# Patient Record
Sex: FEMALE | Race: WHITE | NOT HISPANIC OR LATINO | Employment: UNEMPLOYED | URBAN - METROPOLITAN AREA
[De-identification: names, ages, dates, MRNs, and addresses within clinical notes are randomized per-mention and may not be internally consistent; named-entity substitution may affect disease eponyms.]

---

## 2017-06-28 ENCOUNTER — TRANSCRIBE ORDERS (OUTPATIENT)
Dept: ADMINISTRATIVE | Facility: HOSPITAL | Age: 47
End: 2017-06-28

## 2017-06-28 DIAGNOSIS — Z12.31 VISIT FOR SCREENING MAMMOGRAM: Primary | ICD-10-CM

## 2017-07-05 ENCOUNTER — HOSPITAL ENCOUNTER (OUTPATIENT)
Dept: MAMMOGRAPHY | Facility: CLINIC | Age: 47
Discharge: HOME | End: 2017-07-05
Payer: COMMERCIAL

## 2017-07-05 DIAGNOSIS — Z12.31 VISIT FOR SCREENING MAMMOGRAM: ICD-10-CM

## 2017-07-05 PROCEDURE — G0202 SCR MAMMO BI INCL CAD: HCPCS

## 2017-07-05 PROCEDURE — 77063 BREAST TOMOSYNTHESIS BI: CPT

## 2018-07-02 ENCOUNTER — TRANSCRIBE ORDERS (OUTPATIENT)
Dept: ADMINISTRATIVE | Facility: HOSPITAL | Age: 48
End: 2018-07-02

## 2018-07-02 DIAGNOSIS — Z12.39 SCREENING BREAST EXAMINATION: Primary | ICD-10-CM

## 2018-07-06 ENCOUNTER — HOSPITAL ENCOUNTER (OUTPATIENT)
Dept: MAMMOGRAPHY | Facility: CLINIC | Age: 48
Discharge: HOME | End: 2018-07-06
Payer: COMMERCIAL

## 2018-07-06 DIAGNOSIS — Z12.39 SCREENING BREAST EXAMINATION: ICD-10-CM

## 2018-07-06 PROCEDURE — 77067 SCR MAMMO BI INCL CAD: CPT

## 2018-07-13 ENCOUNTER — HOSPITAL ENCOUNTER (OUTPATIENT)
Dept: MAMMOGRAPHY | Facility: CLINIC | Age: 48
Discharge: HOME | End: 2018-07-13
Payer: COMMERCIAL

## 2018-07-13 DIAGNOSIS — R92.8 ABNORMAL MAMMOGRAM: ICD-10-CM

## 2018-07-13 PROCEDURE — 77065 DX MAMMO INCL CAD UNI: CPT

## 2018-07-13 RX ORDER — BUTALBITAL, ACETAMINOPHEN AND CAFFEINE 50; 325; 40 MG/1; MG/1; MG/1
1 TABLET ORAL EVERY 4 HOURS
COMMUNITY
Start: 2012-05-30

## 2018-07-13 RX ORDER — ZINC GLUCONATE 50 MG
TABLET ORAL
COMMUNITY

## 2018-07-13 RX ORDER — ALPRAZOLAM 0.5 MG/1
0.5 TABLET ORAL
COMMUNITY

## 2018-07-13 RX ORDER — MULTIVIT-MIN/IRON/FOLIC ACID/K 18-600-40
CAPSULE ORAL
COMMUNITY

## 2018-07-13 RX ORDER — AZELASTINE HCL 205.5 UG/1
2 SPRAY NASAL
COMMUNITY
Start: 2017-12-05

## 2018-07-13 NOTE — PROGRESS NOTES
Met with patient and Dr Ermias Arita regarding recommendation for;      _____ RIGHT __x____LEFT      _____Ultrasound guided  ___x___Stereotactic  Breast biopsy  ___x__Verbalized understanding        Blood thinners:  _____yes __x___no    Date stopped: ____n/a_______    Biopsy teaching sheet given:  ____x___yes ______no

## 2018-07-23 ENCOUNTER — HOSPITAL ENCOUNTER (OUTPATIENT)
Dept: MAMMOGRAPHY | Facility: CLINIC | Age: 48
Discharge: HOME | End: 2018-07-23
Payer: COMMERCIAL

## 2018-07-23 ENCOUNTER — HOSPITAL ENCOUNTER (OUTPATIENT)
Dept: MAMMOGRAPHY | Facility: CLINIC | Age: 48
Discharge: HOME | End: 2018-07-23
Admitting: RADIOLOGY
Payer: COMMERCIAL

## 2018-07-23 VITALS — SYSTOLIC BLOOD PRESSURE: 124 MMHG | DIASTOLIC BLOOD PRESSURE: 80 MMHG | HEART RATE: 72 BPM

## 2018-07-23 DIAGNOSIS — R92.8 OTHER ABNORMAL AND INCONCLUSIVE FINDINGS ON DIAGNOSTIC IMAGING OF BREAST: ICD-10-CM

## 2018-07-23 DIAGNOSIS — R92.0 MAMMOGRAPHIC MICROCALCIFICATION: ICD-10-CM

## 2018-07-23 PROCEDURE — 88305 TISSUE EXAM BY PATHOLOGIST: CPT | Performed by: PATHOLOGY

## 2018-07-23 PROCEDURE — 88341 IMHCHEM/IMCYTCHM EA ADD ANTB: CPT | Performed by: PATHOLOGY

## 2018-07-23 PROCEDURE — 88342 IMHCHEM/IMCYTCHM 1ST ANTB: CPT | Performed by: PATHOLOGY

## 2018-07-23 PROCEDURE — 19081 BX BREAST 1ST LESION STRTCTC: CPT

## 2018-07-23 PROCEDURE — 19082 BX BREAST ADD LESION STRTCTC: CPT

## 2018-07-23 RX ORDER — LIDOCAINE HYDROCHLORIDE AND EPINEPHRINE BITARTRATE 20; .01 MG/ML; MG/ML
10 INJECTION, SOLUTION SUBCUTANEOUS ONCE
Status: COMPLETED | OUTPATIENT
Start: 2018-07-23 | End: 2018-07-23

## 2018-07-23 RX ORDER — LIDOCAINE HYDROCHLORIDE 10 MG/ML
5 INJECTION, SOLUTION INFILTRATION; PERINEURAL ONCE
Status: COMPLETED | OUTPATIENT
Start: 2018-07-23 | End: 2018-07-23

## 2018-07-23 RX ADMIN — LIDOCAINE HYDROCHLORIDE 5 ML: 10 INJECTION, SOLUTION INFILTRATION; PERINEURAL at 09:24

## 2018-07-23 RX ADMIN — LIDOCAINE HYDROCHLORIDE 5 ML: 10 INJECTION, SOLUTION INFILTRATION; PERINEURAL at 09:55

## 2018-07-23 RX ADMIN — LIDOCAINE HYDROCHLORIDE AND EPINEPHRINE 10 ML: 20; 10 INJECTION, SOLUTION INFILTRATION; PERINEURAL at 09:55

## 2018-07-23 RX ADMIN — LIDOCAINE HYDROCHLORIDE AND EPINEPHRINE 10 ML: 20; 10 INJECTION, SOLUTION INFILTRATION; PERINEURAL at 09:24

## 2018-07-23 NOTE — PROGRESS NOTES
Procedure type:    _____ultrasound guided ___X__stereotactic    Breast:    __X___Left _____Right    Location:Medial position    Needle:8ga Revolve    # of passes: 3 (1 core with calcs)(2 cores no calcs)    Clip:Triple twist    Performed by:Dr Hannah Dominguez held for 5 minutes by: May Flynn(PCA)    Steri Strips:    ____X_yes _____no    Band aid:    __X___yes_____no    Tape and guaze:    _____yes _____no    Tolerated procedure:    ___X__yes _____no

## 2018-07-23 NOTE — PROGRESS NOTES
POST LARGE CORE BREAST BIOPSY PATIENT INFORMATION      1  Place an ice pack inside your bra over the top of the dressing every hour for 20 minutes (20 minutes on, 60 minutes off)  Do this until bedtime  2  Do not shower or bathe until the following morning  3  You may bathe your breast carefully with the steri-strips in place  Be careful    Not to loosen them  The steri-strips will fall off in 3-5 days  4  You may have mild discomfort, and you may have some bruising where the   Needle entered the skin  This should clear within 5-7 days  5  If you need medicine for discomfort, take acetaminophen products such as   Tylenol  You may also take Advil or Motrin products  6  Do not participate in strenuous activities such as-tennis, aerobics, skiing,  Weight lifting, etc  for 24 hours  Refrain from swimming/soaking for 72 hours  7  Wearing a bra for sleeping may be more comfortable for the first 24-48 hours  8  Watch for continued bleeding, pain or fever over 101; please call with any questions or concerns  For procedures done at the [facility]  [facility] call:  Erik Ding RN at [phone]  Emani Barrett RN at [phone]                    *After 4 PM call the Interventional Radiology Department                    [phone] and ask to speak with the nurse on call  For procedures done at the [address] [place] [address] call:         Shayy Zaman RN at   *After 4 PM call the Interventional Radiology Department   [phone] and ask to speak with the nurse on call  For procedures done at [address] [place] [address] call: The Radiology Nurse at [phone]  *After 4 PM call your physician, or go to the Emergency Department  9          The final results of your biopsy are usually available within one week  Ice pack given:    ___X_yes _____no    Discharge instructions signed by patient:    __X___yes _____no    Discharge instructions given to patient:    ___X__yes _____no    Discharged via:    __X___amulatory    _____wheelchair    _____stretcher    Stable on discharge:    ___X__yes ____no

## 2018-07-23 NOTE — PROGRESS NOTES
Procedure type:    _____ultrasound guided ___X__stereotactic    Breast:    ___X__Left _____Right    Location:Latral position    Needle: 8ga Revolve    # of passes: 4 (3 cores with calcs)(1 core no calcs)    Clip:Toro    Performed by:Dr Jaja Alonzo held for 5 minutes by:  Sidney Flynn(PCA)    Steri Strips:    ___X__yes _____no    Band aid:    __X__yes_____no    Tape and guaze:    _____yes _____no    Tolerated procedure:    __X___yes _____no

## 2018-07-24 NOTE — PROGRESS NOTES
Post procedure call completed on 7/24/18 @ 0846 x    Bleeding: _____yes __x___no    Pain: __x___yes ______no    Redness/Swelling: ___x___yes ______no    Band aid removed: _____yes __x___no    Steri-Strips intact: __x____yes _____no    Pt reports "pretty sore" and a little swollen  Taking Ibuprofen and continues this am with ice as directed  Has not showered yet  Will remove band aid after shower

## 2018-07-25 ENCOUNTER — TRANSCRIBE ORDERS (OUTPATIENT)
Dept: ADMINISTRATIVE | Age: 48
End: 2018-07-25

## 2018-07-25 ENCOUNTER — HOSPITAL ENCOUNTER (OUTPATIENT)
Dept: RADIOLOGY | Age: 48
Discharge: HOME | End: 2018-07-25

## 2018-07-25 DIAGNOSIS — R92.1 BREAST CALCIFICATION SEEN ON MAMMOGRAM: Primary | ICD-10-CM

## 2018-07-25 DIAGNOSIS — R92.1 BREAST CALCIFICATION SEEN ON MAMMOGRAM: ICD-10-CM

## 2018-07-26 ENCOUNTER — TELEPHONE (OUTPATIENT)
Dept: MAMMOGRAPHY | Facility: CLINIC | Age: 48
End: 2018-07-26

## 2018-07-26 NOTE — PROGRESS NOTES
Pt seen today, 7/26/18 at 21  for site evaluation s/p left stereotactic breast biopsy x 2  Pt was concerned about blood spotting from one of the sites  The more inferior biopsy site is clean and dry with steri strips intact  The upper more outer strips were changed and incision is moist but no actual bleeding  There is an area of redness with some warmth around the more upper outer incision, but it appears to be associated with a developing bruise  The area marked with ink pen  Dr Stacie Iniguez also evaluated the site and told her to keep an eye on the site and use the OTC ibuprofen and he also stated ice application can be continued as she said it appears to help with the pain/discomfort

## 2018-07-27 NOTE — PROGRESS NOTES
Pt was seen today, 7/27/18, for a site evaluation  Pt was concerned about bleeding from her left biopsy site which she said was increased from yesterday  Steri strips are bloody, but active bleeding not present  The outer site is more bruised, but no redness evident today  Dr Paulina Velasquez evaluated site and suggested warm compresses to the site which she was told might cause a little more bleeding from the bruise under the skin  Gauze pressure dsg applied and told the pt to remove if any sign of skin irritation from the tape and then place gauze over site with a snug bra if skin becomes irritated  Prerna Moulton

## 2018-08-02 NOTE — PROGRESS NOTES
Pt was seen for a site check today 8/2/18 at 1145  She called to say she had no bleeding from the outer inferior breast biopsy site for 2 days and then noticed bleeding after a shower today  Steri strips removed  No active bleeding or any drainage apparent at site  Small amount of skin redness immediately around biopsy niche which remains slightly open  New steri strips applied, but instructed pt to remove steri strips if no further bleeding occurs  Dr Betty Gutierrez also observed site  Instructed pt to call if she has any further concerns

## 2018-08-10 NOTE — PROGRESS NOTES
Pt called and said she noticed some new bruising in her left breast and so she was seen today 8/10/18 at 1150 for a site check  Left breast biopsy incision sites are healed with a little crusting over the lateral inferior site which had been previously open at last site check  Small area of minimally faint bruising is seen near the second more medial site which she said is new  She still has a lot of tenderness and hematoma is apparent  There is no redness, inflammation or drainage  Dr Nabil Gonzáles recommended warm compresses to the site and stated she could now go swimming because the incisional areas are sealed

## 2019-01-23 ENCOUNTER — HOSPITAL ENCOUNTER (OUTPATIENT)
Dept: MAMMOGRAPHY | Facility: CLINIC | Age: 49
Discharge: HOME | End: 2019-01-23
Payer: COMMERCIAL

## 2019-01-23 VITALS — BODY MASS INDEX: 32.43 KG/M2 | WEIGHT: 183 LBS | HEIGHT: 63 IN

## 2019-01-23 DIAGNOSIS — R92.0 MAMMOGRAPHIC MICROCALCIFICATION: ICD-10-CM

## 2019-01-23 PROCEDURE — 77065 DX MAMMO INCL CAD UNI: CPT

## 2019-01-23 PROCEDURE — G0279 TOMOSYNTHESIS, MAMMO: HCPCS

## 2019-07-12 ENCOUNTER — TRANSCRIBE ORDERS (OUTPATIENT)
Dept: ADMINISTRATIVE | Facility: HOSPITAL | Age: 49
End: 2019-07-12

## 2019-07-12 DIAGNOSIS — Z12.39 BREAST SCREENING, UNSPECIFIED: Primary | ICD-10-CM

## 2019-07-31 ENCOUNTER — HOSPITAL ENCOUNTER (OUTPATIENT)
Dept: MAMMOGRAPHY | Facility: CLINIC | Age: 49
Discharge: HOME | End: 2019-07-31
Payer: COMMERCIAL

## 2019-07-31 VITALS — HEIGHT: 63 IN | WEIGHT: 183 LBS | BODY MASS INDEX: 32.43 KG/M2

## 2019-07-31 DIAGNOSIS — Z12.39 BREAST SCREENING, UNSPECIFIED: ICD-10-CM

## 2019-07-31 DIAGNOSIS — Z12.39 SCREENING FOR MALIGNANT NEOPLASM OF BREAST: ICD-10-CM

## 2019-07-31 PROCEDURE — 77067 SCR MAMMO BI INCL CAD: CPT

## 2019-07-31 PROCEDURE — 77063 BREAST TOMOSYNTHESIS BI: CPT

## 2020-07-24 ENCOUNTER — TRANSCRIBE ORDERS (OUTPATIENT)
Dept: ADMINISTRATIVE | Facility: HOSPITAL | Age: 50
End: 2020-07-24

## 2020-07-24 DIAGNOSIS — Z12.31 ENCOUNTER FOR SCREENING MAMMOGRAM FOR MALIGNANT NEOPLASM OF BREAST: Primary | ICD-10-CM

## 2020-09-08 ENCOUNTER — HOSPITAL ENCOUNTER (OUTPATIENT)
Dept: BONE DENSITY | Facility: IMAGING CENTER | Age: 50
Discharge: HOME | End: 2020-09-08
Payer: COMMERCIAL

## 2020-09-08 VITALS — BODY MASS INDEX: 32.78 KG/M2 | HEIGHT: 63 IN | WEIGHT: 185 LBS

## 2020-09-08 DIAGNOSIS — Z12.31 ENCOUNTER FOR SCREENING MAMMOGRAM FOR MALIGNANT NEOPLASM OF BREAST: ICD-10-CM

## 2020-09-08 PROCEDURE — 77067 SCR MAMMO BI INCL CAD: CPT

## 2020-09-08 PROCEDURE — 77063 BREAST TOMOSYNTHESIS BI: CPT

## 2021-09-15 ENCOUNTER — HOSPITAL ENCOUNTER (OUTPATIENT)
Dept: MAMMOGRAPHY | Facility: IMAGING CENTER | Age: 51
Discharge: HOME | End: 2021-09-15
Payer: COMMERCIAL

## 2021-09-15 VITALS — WEIGHT: 185 LBS | HEIGHT: 63 IN | BODY MASS INDEX: 32.78 KG/M2

## 2021-09-15 DIAGNOSIS — Z12.31 VISIT FOR SCREENING MAMMOGRAM: ICD-10-CM

## 2021-09-15 DIAGNOSIS — Z12.31 ENCOUNTER FOR SCREENING MAMMOGRAM FOR MALIGNANT NEOPLASM OF BREAST: ICD-10-CM

## 2021-09-15 PROCEDURE — 77067 SCR MAMMO BI INCL CAD: CPT

## 2021-09-15 PROCEDURE — 77063 BREAST TOMOSYNTHESIS BI: CPT

## 2022-01-07 ENCOUNTER — APPOINTMENT (OUTPATIENT)
Dept: RADIOLOGY | Facility: CLINIC | Age: 52
End: 2022-01-07
Payer: COMMERCIAL

## 2022-01-07 DIAGNOSIS — M25.559 PAIN IN HIP JOINT: ICD-10-CM

## 2022-01-07 PROCEDURE — 73502 X-RAY EXAM HIP UNI 2-3 VIEWS: CPT

## 2022-12-04 ENCOUNTER — HOSPITAL ENCOUNTER (OUTPATIENT)
Dept: CT IMAGING | Facility: HOSPITAL | Age: 52
Discharge: HOME | End: 2022-12-04
Attending: FAMILY MEDICINE

## 2022-12-04 DIAGNOSIS — R09.89 ABNORMAL CHEST SOUNDS: ICD-10-CM

## 2022-12-21 ENCOUNTER — HOSPITAL ENCOUNTER (OUTPATIENT)
Dept: MAMMOGRAPHY | Facility: CLINIC | Age: 52
Discharge: HOME | End: 2022-12-21

## 2022-12-21 VITALS — WEIGHT: 195 LBS | BODY MASS INDEX: 34.55 KG/M2 | HEIGHT: 63 IN

## 2022-12-21 DIAGNOSIS — Z12.31 ENCOUNTER FOR SCREENING MAMMOGRAM FOR MALIGNANT NEOPLASM OF BREAST: ICD-10-CM

## 2023-02-13 ENCOUNTER — APPOINTMENT (OUTPATIENT)
Dept: RADIOLOGY | Facility: CLINIC | Age: 53
End: 2023-02-13

## 2023-02-13 DIAGNOSIS — M79.673 FOOT PAIN: ICD-10-CM

## 2024-01-19 ENCOUNTER — HOSPITAL ENCOUNTER (OUTPATIENT)
Dept: MAMMOGRAPHY | Facility: CLINIC | Age: 54
Discharge: HOME | End: 2024-01-19
Payer: COMMERCIAL

## 2024-01-19 VITALS — HEIGHT: 63 IN | BODY MASS INDEX: 34.55 KG/M2 | WEIGHT: 195 LBS

## 2024-01-19 DIAGNOSIS — Z12.31 ENCOUNTER FOR SCREENING MAMMOGRAM FOR MALIGNANT NEOPLASM OF BREAST: ICD-10-CM

## 2024-01-19 PROCEDURE — 77063 BREAST TOMOSYNTHESIS BI: CPT

## 2024-01-19 PROCEDURE — 77067 SCR MAMMO BI INCL CAD: CPT

## 2024-09-24 ENCOUNTER — HOSPITAL ENCOUNTER (OUTPATIENT)
Dept: NON INVASIVE DIAGNOSTICS | Facility: HOSPITAL | Age: 54
Discharge: HOME | End: 2024-09-24
Payer: COMMERCIAL

## 2024-09-24 VITALS
SYSTOLIC BLOOD PRESSURE: 126 MMHG | WEIGHT: 195 LBS | DIASTOLIC BLOOD PRESSURE: 90 MMHG | BODY MASS INDEX: 34.55 KG/M2 | HEIGHT: 63 IN | HEART RATE: 80 BPM

## 2024-09-24 DIAGNOSIS — I10 PRIMARY HYPERTENSION: ICD-10-CM

## 2024-09-24 LAB
AORTIC ROOT: 3.2 CM
APICAL FOUR CHAMBER EJECTION FRACTION: 66 %
BSA FOR ECHO PROCEDURE: 1.91 M2
E WAVE DECELERATION TIME: 265 MS
E/A RATIO: 1.28
FRACTIONAL SHORTENING: 35 (ref 28–44)
INTERVENTRICULAR SEPTUM IN DIASTOLE (PARASTERNAL SHORT AXIS VIEW): 1 CM
INTERVENTRICULAR SEPTUM: 1 CM (ref 0.6–1.1)
LAAS-AP2: 19.2 CM2
LAAS-AP4: 17.5 CM2
LEFT ATRIUM SIZE: 3.7 CM
LEFT ATRIUM VOLUME (MOD BIPLANE): 52 ML
LEFT ATRIUM VOLUME INDEX (MOD BIPLANE): 27.2 ML/M2
LEFT INTERNAL DIMENSION IN SYSTOLE: 3 CM (ref 2.1–4)
LEFT VENTRICULAR INTERNAL DIMENSION IN DIASTOLE: 4.6 CM (ref 3.5–6)
LEFT VENTRICULAR POSTERIOR WALL IN END DIASTOLE: 1 CM
LEFT VENTRICULAR STROKE VOLUME: 63 ML
LVSV (TEICH): 63 ML
MV E'TISSUE VEL-SEP: 8 CM/S
MV PEAK A VEL: 0.82 M/S
MV PEAK E VEL: 105 CM/S
MV STENOSIS PRESSURE HALF TIME: 77 MS
MV VALVE AREA P 1/2 METHOD: 2.86
RA PRESSURE ESTIMATED: 5 MMHG
RIGHT ATRIUM AREA SYSTOLE A4C: 14.9 CM2
RIGHT VENTRICLE ID DIMENSION: 3.6 CM
RV PSP: 21 MMHG
SL CV LEFT ATRIUM LENGTH A2C: 6 CM
SL CV LV EF: 65
SL CV PED ECHO LEFT VENTRICLE DIASTOLIC VOLUME (MOD BIPLANE) 2D: 99 ML
SL CV PED ECHO LEFT VENTRICLE SYSTOLIC VOLUME (MOD BIPLANE) 2D: 36 ML
TR MAX PG: 16 MMHG
TR PEAK VELOCITY: 2 M/S
TRICUSPID ANNULAR PLANE SYSTOLIC EXCURSION: 2.4 CM
TRICUSPID VALVE PEAK REGURGITATION VELOCITY: 1.99 M/S

## 2024-09-24 PROCEDURE — 93306 TTE W/DOPPLER COMPLETE: CPT

## 2024-09-24 PROCEDURE — 93306 TTE W/DOPPLER COMPLETE: CPT | Performed by: STUDENT IN AN ORGANIZED HEALTH CARE EDUCATION/TRAINING PROGRAM

## 2024-10-01 ENCOUNTER — OFFICE VISIT (OUTPATIENT)
Dept: CARDIOLOGY CLINIC | Facility: CLINIC | Age: 54
End: 2024-10-01
Payer: COMMERCIAL

## 2024-10-01 VITALS
SYSTOLIC BLOOD PRESSURE: 122 MMHG | HEIGHT: 63 IN | OXYGEN SATURATION: 97 % | WEIGHT: 188 LBS | BODY MASS INDEX: 33.31 KG/M2 | DIASTOLIC BLOOD PRESSURE: 80 MMHG | HEART RATE: 68 BPM

## 2024-10-01 DIAGNOSIS — I10 HYPERTENSION, UNSPECIFIED TYPE: Primary | ICD-10-CM

## 2024-10-01 DIAGNOSIS — Z82.49 FAMILY HISTORY OF EARLY CAD: ICD-10-CM

## 2024-10-01 DIAGNOSIS — R07.9 CHEST PAIN, UNSPECIFIED TYPE: ICD-10-CM

## 2024-10-01 DIAGNOSIS — E78.5 DYSLIPIDEMIA: ICD-10-CM

## 2024-10-01 PROCEDURE — 99204 OFFICE O/P NEW MOD 45 MIN: CPT | Performed by: STUDENT IN AN ORGANIZED HEALTH CARE EDUCATION/TRAINING PROGRAM

## 2024-10-01 RX ORDER — CYANOCOBALAMIN (VITAMIN B-12) 500 MCG
TABLET ORAL
COMMUNITY
Start: 2020-09-04

## 2024-10-01 RX ORDER — LOSARTAN POTASSIUM 50 MG/1
TABLET ORAL EVERY 24 HOURS
COMMUNITY
Start: 2024-08-08

## 2024-10-01 RX ORDER — CYCLOBENZAPRINE HCL 10 MG
TABLET ORAL
COMMUNITY

## 2024-10-01 RX ORDER — FEXOFENADINE HCL 180 MG/1
TABLET ORAL EVERY 24 HOURS
COMMUNITY

## 2024-10-01 RX ORDER — FLUTICASONE PROPIONATE 50 MCG
SPRAY, SUSPENSION (ML) NASAL EVERY 24 HOURS
COMMUNITY

## 2024-10-01 RX ORDER — MONTELUKAST SODIUM 10 MG/1
TABLET ORAL EVERY 24 HOURS
COMMUNITY
Start: 2022-11-02

## 2024-10-01 NOTE — PROGRESS NOTES
[facility]  [address]  [facility] [address]  Phone#  [phone]  Fax#  [phone]  [facility]'s Cardiology Office Consultation             NAME: Joselin Nj  AGE: 54 y.o. SEX: female   : 1970   MRN: [id number]    DATE: 10/1/2024  TIME: 6:15 PM    Assessment & Plan  Hypertension, unspecified type  Episodic. Facing flushing and palpitations. Suggestive of neuroendocrine etiology.  -check 24 hour urine metanephrines and catecholamines to rule of pheochromocytoma  Chest pain, unspecified type  Not consistent with typical anginal chest pain. Low-to-intermediate risk. HTN, HLD, and early Fhx. No abnormalities on resting ECG.  -exercise ECG stress test ordered  Dyslipidemia  Stable. Low ASCVD risk score. Discussed dietary recommendations to lower cholesterol. Will check lp(a) given early family history  -Lp(a) ordered. If elevated, will recommend statin  Family history of early CAD  -check Lp(a)    Follow up 6 mo         ----------------------------------------------------------------------------------    Chief Complaint   Patient presents with    New Patient Visit     High BP. .Episodes of getting fluhed and gets a headache immediately. L arm keeps falling asleep. Pain in chest intermittently for the past 3-4 months. Family history of cardiac issues.    Symptoms do not correspond with each other; come at different times     Palpitations     Randomly     Chest Pain     Chest pain is random    Dizziness     Lightheadedness; not does correspond with other symptoms        HPI:    Joselin Nj is a 54 y.o.-year-old female who presents to the cardiology clinic for initial consultation.    She has several symptoms to discuss today. These occur intermittently without identifiable triggers. She will develop flushing in the face. This often triggers migraine/sharp headaches that last for short durations. Occasionally developed pressure-like chest pain that feels like a dull  achy sensation.. Not always brought on by exertion. Lasts approximately.     Blood pressure usually in the 120s-130s/80s-90s. Occasional spike to 140s-150s/90s-100s. Not always associated with above symptoms.    Family history significant for early CAD> Maternal grandfather had MI in late 40s - early 50s.      Lipid panel showed total cholesterol 208, HDL 50, ,    ASCVD risk score (pooled cohorts) 3.0%      I personally reviewed the patient's pertinent cardiac imaging and labs in Epic:    I reviewed the ECG from her PCP (see media) - normal sinus rhythm. Normal ECG    9/24/24 TTE - normal      Past history, family history, social history, current medications, vital signs, recent lab and imaging studies and  prior cardiology studies reviewed independently on this visit.    Allergies   Allergen Reactions    Amoxicillin-Pot Clavulanate Vomiting and Nausea Only    Clarithromycin Hives     Other reaction(s): hives, vomitting    Doxycycline GI Intolerance and Hives     Other reaction(s): hives, gi upset    Hydrocodone-Acetaminophen Itching     Other reaction(s): itch    Morphine Itching    Sulfa Antibiotics Other (See Comments)    Sulfamethoxazole-Trimethoprim Hives and GI Intolerance     Other reaction(s): hives, gi upset       Current Outpatient Medications:     ALPRAZolam (XANAX) 0.5 mg tablet, Take 0.5 mg by mouth, Disp: , Rfl:     Ascorbic Acid (VITAMIN C) 500 MG CAPS, , Disp: , Rfl:     Azelastine HCl 0.15 % SOLN, 2 sprays into each nostril, Disp: , Rfl:     butalbital-acetaminophen-caffeine (FIORICET,ESGIC) -40 mg per tablet, Take 1 tablet by mouth every 4 (four) hours, Disp: , Rfl:     Cyanocobalamin (Vitamin B 12) 500 MCG TABS, , Disp: , Rfl:     cyclobenzaprine (FLEXERIL) 10 mg tablet, 1 tablet, Disp: , Rfl:     fexofenadine (ALLEGRA) 180 MG tablet, every 24 hours, Disp: , Rfl:     fluticasone (FLONASE) 50 mcg/act nasal spray, every 24 hours, Disp: , Rfl:     losartan (COZAAR) 50 mg tablet,  every 24 hours, Disp: , Rfl:     montelukast (Singulair) 10 mg tablet, every 24 hours, Disp: , Rfl:     sertraline (ZOLOFT) 50 mg tablet, every 24 hours, Disp: , Rfl:     Zinc 50 MG TABS, 1 tablet, Disp: , Rfl:     Cholecalciferol 400 units CAPS, 1 tablet, Disp: , Rfl:     No past medical history on file.  Past Surgical History:   Procedure Laterality Date    BREAST BIOPSY Left 07/23/2018    2 areas- both benign    HYSTERECTOMY  2005    MAMMO STEREOTACTIC BREAST BIOPSY LEFT (ALL INC) Left 07/23/2018    MAMMO STEREOTACTIC BREAST BIOPSY LEFT (ALL INC) EACH ADD Left 07/23/2018     Family History   Problem Relation Age of Onset    No Known Problems Mother     Colon cancer Father         about 77    No Known Problems Sister     No Known Problems Daughter     No Known Problems Maternal Grandmother     No Known Problems Maternal Grandfather     No Known Problems Paternal Grandmother     No Known Problems Paternal Grandfather     No Known Problems Maternal Aunt     No Known Problems Paternal Aunt     No Known Problems Paternal Aunt        Social History        Review of Systems   Constitutional:  Positive for diaphoresis. Negative for fatigue.   HENT: Negative.     Respiratory:  Positive for choking.    Cardiovascular:  Positive for chest pain and palpitations.   Neurological:  Positive for numbness and headaches.   Psychiatric/Behavioral: Negative.         Objective:     Vitals:    10/01/24 1542   BP: 122/80   Pulse: 68   SpO2: 97%     Physical Exam  Vitals reviewed.   Constitutional:       General: She is not in acute distress.     Appearance: She is well-developed.   HENT:      Head: Normocephalic and atraumatic.   Eyes:      Conjunctiva/sclera: Conjunctivae normal.   Cardiovascular:      Rate and Rhythm: Normal rate and regular rhythm.      Heart sounds: No murmur heard.  Pulmonary:      Effort: Pulmonary effort is normal. No respiratory distress.      Breath sounds: Normal breath sounds.   Musculoskeletal:          "General: No swelling.      Cervical back: Neck supple.   Skin:     General: Skin is warm and dry.   Neurological:      Mental Status: She is alert.   Psychiatric:         Mood and Affect: Mood normal.         Pertinent Laboratory/Diagnostic Studies:    Laboratory studies reviewed personally by Vee Payne MD    [facility] Labs from Jan 2024  Lipid panel reviewed (see above)  BMP and CBC normal      Imaging Studies:     Pertinent cardiac studies and imaging studies were personally reviewed on this visit and results summarized above.    Vee Payne MD, PhD    Portions of the record may have been created with voice recognition software.  Occasional wrong word or \"sound alike\" substitutions may have occurred due to the inherent limitations of voice recognition software.  Read the chart carefully and recognize, using context, where substitutions have occurred. Please reach out to me directly for any clarifications.    "

## 2024-10-05 LAB — LPA SERPL-SCNC: 27 NMOL/L

## 2024-10-11 ENCOUNTER — HOSPITAL ENCOUNTER (OUTPATIENT)
Dept: NON INVASIVE DIAGNOSTICS | Facility: CLINIC | Age: 54
Discharge: HOME | End: 2024-10-11
Payer: COMMERCIAL

## 2024-10-11 VITALS
DIASTOLIC BLOOD PRESSURE: 78 MMHG | WEIGHT: 188 LBS | OXYGEN SATURATION: 96 % | HEART RATE: 75 BPM | HEIGHT: 63 IN | SYSTOLIC BLOOD PRESSURE: 158 MMHG | BODY MASS INDEX: 33.31 KG/M2

## 2024-10-11 DIAGNOSIS — E78.5 DYSLIPIDEMIA: ICD-10-CM

## 2024-10-11 LAB
CHEST PAIN STATEMENT: NORMAL
MAX DIASTOLIC BP: 90 MMHG
MAX HR PERCENT: 101 %
MAX HR: 166 BPM
MAX PREDICTED HEART RATE: 164 BPM
PROTOCOL NAME: NORMAL
RATE PRESSURE PRODUCT: NORMAL
REASON FOR TERMINATION: NORMAL
SL CV STRESS RECOVERY BP: NORMAL MMHG
SL CV STRESS RECOVERY HR: 92 BPM
SL CV STRESS RECOVERY O2 SAT: 98 %
SL CV STRESS STAGE REACHED: 4
STRESS ANGINA INDEX: 0
STRESS BASELINE BP: NORMAL MMHG
STRESS BASELINE HR: 75 BPM
STRESS DUKE TREADMILL SCORE: 11
STRESS O2 SAT REST: 96 %
STRESS PEAK HR: 166 BPM
STRESS POST ESTIMATED WORKLOAD: 13.4 METS
STRESS POST EXERCISE DUR MIN: 10 MIN
STRESS POST EXERCISE DUR MIN: 10 MIN
STRESS POST EXERCISE DUR SEC: 30 SEC
STRESS POST EXERCISE DUR SEC: 30 SEC
STRESS POST O2 SAT PEAK: 96 %
STRESS POST PEAK BP: 178 MMHG
STRESS POST PEAK HR: 166 BPM
STRESS POST PEAK SYSTOLIC BP: 178 MMHG
STRESS ST ELEVATION: 0 MM
TARGET HR FORMULA: NORMAL
TEST INDICATION: NORMAL

## 2024-10-11 PROCEDURE — 93018 CV STRESS TEST I&R ONLY: CPT | Performed by: INTERNAL MEDICINE

## 2024-10-11 PROCEDURE — 93017 CV STRESS TEST TRACING ONLY: CPT

## 2024-10-11 PROCEDURE — 93016 CV STRESS TEST SUPVJ ONLY: CPT | Performed by: INTERNAL MEDICINE

## 2024-10-14 LAB
CHEST PAIN STATEMENT: NORMAL
MAX DIASTOLIC BP: 90 MMHG
MAX PREDICTED HEART RATE: 164 BPM
PROTOCOL NAME: NORMAL
REASON FOR TERMINATION: NORMAL
STRESS POST EXERCISE DUR MIN: 10 MIN
STRESS POST EXERCISE DUR SEC: 30 SEC
STRESS POST PEAK HR: 166 BPM
STRESS POST PEAK SYSTOLIC BP: 178 MMHG
TARGET HR FORMULA: NORMAL
TEST INDICATION: NORMAL

## 2025-01-24 ENCOUNTER — HOSPITAL ENCOUNTER (OUTPATIENT)
Dept: MAMMOGRAPHY | Facility: CLINIC | Age: 55
Discharge: HOME | End: 2025-01-24
Payer: COMMERCIAL

## 2025-01-24 VITALS — WEIGHT: 188 LBS | HEIGHT: 63 IN | BODY MASS INDEX: 33.31 KG/M2

## 2025-01-24 DIAGNOSIS — Z12.31 ENCOUNTER FOR SCREENING MAMMOGRAM FOR MALIGNANT NEOPLASM OF BREAST: ICD-10-CM

## 2025-01-24 PROCEDURE — 77067 SCR MAMMO BI INCL CAD: CPT

## 2025-01-24 PROCEDURE — 77063 BREAST TOMOSYNTHESIS BI: CPT
